# Patient Record
Sex: MALE | Race: ASIAN | NOT HISPANIC OR LATINO
[De-identification: names, ages, dates, MRNs, and addresses within clinical notes are randomized per-mention and may not be internally consistent; named-entity substitution may affect disease eponyms.]

---

## 2022-11-04 ENCOUNTER — APPOINTMENT (OUTPATIENT)
Dept: CARDIOLOGY | Facility: CLINIC | Age: 32
End: 2022-11-04

## 2022-11-04 VITALS
BODY MASS INDEX: 21.33 KG/M2 | WEIGHT: 144 LBS | RESPIRATION RATE: 18 BRPM | OXYGEN SATURATION: 96 % | HEIGHT: 68.9 IN | HEART RATE: 70 BPM | TEMPERATURE: 97.6 F | SYSTOLIC BLOOD PRESSURE: 127 MMHG | DIASTOLIC BLOOD PRESSURE: 87 MMHG

## 2022-11-04 DIAGNOSIS — Z87.442 PERSONAL HISTORY OF URINARY CALCULI: ICD-10-CM

## 2022-11-04 DIAGNOSIS — R94.31 ABNORMAL ELECTROCARDIOGRAM [ECG] [EKG]: ICD-10-CM

## 2022-11-04 DIAGNOSIS — R00.2 PALPITATIONS: ICD-10-CM

## 2022-11-04 DIAGNOSIS — F17.200 NICOTINE DEPENDENCE, UNSPECIFIED, UNCOMPLICATED: ICD-10-CM

## 2022-11-04 DIAGNOSIS — Z78.9 OTHER SPECIFIED HEALTH STATUS: ICD-10-CM

## 2022-11-04 PROBLEM — Z00.00 ENCOUNTER FOR PREVENTIVE HEALTH EXAMINATION: Status: ACTIVE | Noted: 2022-11-04

## 2022-11-04 PROCEDURE — 93015 CV STRESS TEST SUPVJ I&R: CPT

## 2022-11-04 PROCEDURE — 99204 OFFICE O/P NEW MOD 45 MIN: CPT | Mod: 25

## 2022-11-09 ENCOUNTER — NON-APPOINTMENT (OUTPATIENT)
Age: 32
End: 2022-11-09

## 2022-11-09 RX ORDER — PROPRANOLOL HYDROCHLORIDE 10 MG/1
10 TABLET ORAL DAILY
Qty: 90 | Refills: 1 | Status: ACTIVE | COMMUNITY
Start: 2022-11-09 | End: 1900-01-01

## 2022-11-09 NOTE — ASSESSMENT
[FreeTextEntry1] : 32 year old male with history of kidney stones who was referred for abnormal ECG and palpitations.\par \par ECG 11/4/22 reviewed showing NSR with diffuse ST elevations likely benign early repolarization (grossly unchanged from 6/30/22). Isolated TWI in V2 is not clinically significant.\par \par Pt states that for the past several months, he has had intermittent palpitations lasting 30 minutes at a time, associated with diaphoresis and chest discomfort. It happens usually once every 1-2 weeks. To improve his symptoms, he usually rests or tries to hold his breath and bend over, which will slowly help with his symptoms. 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 He is smoking cigarettes 1/2 ppd. \par \par 1) palpitations, intermittent, improves with holding breath/bending down, sometimes worse with exertion\par - possible ?SVT based on clinical history\par - avoidance of caffeine, smoking cessation\par - I advised treadmill ECG stress to assess functional capacity and to assess for arrhythmia -- pt exercised for 8:15 min on [place] protocol without ischemic ECG changes or arrhythmia\par - I advised 1 week monitor to assess for arrhythmia, however, since insurance not covering event monitor costs fully, patient opted for 24 hour Holter --> 24 hour Holter monitor reviewed - overall sinus rhythm (avg 80 bpm, minimum HR 48 bpm, max 131 bpm), rare VPCs, rare APCs, blocked APCs. No SVT was identified. \par - Given intermittent symptoms, I offered pt propranolol 10mg PRN for palpitations\par \par 2) abnormal ECG\par - likely related to benign early repolarization\par \par 3) Follow-up, as needed or recurrent symptoms

## 2022-11-09 NOTE — HISTORY OF PRESENT ILLNESS
[FreeTextEntry1] : 32 year old male with history of kidney stones who was referred for abnormal ECG and palpitations.\par \par ECG 11/4/22 reviewed showing NSR with diffuse ST elevations likely benign early repolarization (grossly unchanged from 6/30/22). Isolated TWI in V2 is not clinically significant.\par \par Pt states that for the past several months, he has had intermittent palpitations lasting 30 minutes at a time, associated with diaphoresis and chest discomfort. It happens usually once every 1-2 weeks. To improve his symptoms, he usually rests or tries to hold his breath and bend over, which will slowly help with his symptoms. 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 He is smoking cigarettes 1/2 ppd. \par \par He was prescribed metoprolol by PCP in the past but never picked it up or used it.
Yes